# Patient Record
Sex: MALE | Race: WHITE | NOT HISPANIC OR LATINO | Employment: FULL TIME | URBAN - NONMETROPOLITAN AREA
[De-identification: names, ages, dates, MRNs, and addresses within clinical notes are randomized per-mention and may not be internally consistent; named-entity substitution may affect disease eponyms.]

---

## 2023-08-25 ENCOUNTER — HOSPITAL ENCOUNTER (EMERGENCY)
Facility: HOSPITAL | Age: 53
Discharge: HOME | End: 2023-08-25
Attending: NURSE PRACTITIONER | Admitting: NURSE PRACTITIONER

## 2023-08-25 VITALS
BODY MASS INDEX: 35.12 KG/M2 | SYSTOLIC BLOOD PRESSURE: 147 MMHG | DIASTOLIC BLOOD PRESSURE: 107 MMHG | TEMPERATURE: 98.6 F | HEART RATE: 72 BPM | WEIGHT: 265 LBS | HEIGHT: 73 IN | RESPIRATION RATE: 22 BRPM | OXYGEN SATURATION: 99 %

## 2023-08-25 DIAGNOSIS — R07.89 RIGHT-SIDED CHEST WALL PAIN: ICD-10-CM

## 2023-08-25 PROCEDURE — 99213 OFFICE O/P EST LOW 20 MIN: CPT | Performed by: NURSE PRACTITIONER

## 2023-08-25 PROCEDURE — G0463 HOSPITAL OUTPT CLINIC VISIT: HCPCS

## 2023-08-25 RX ORDER — DILTIAZEM HYDROCHLORIDE 240 MG/1
240 CAPSULE, EXTENDED RELEASE ORAL DAILY
COMMUNITY

## 2023-08-25 RX ORDER — PANTOPRAZOLE SODIUM 40 MG/1
40 TABLET, DELAYED RELEASE ORAL DAILY
COMMUNITY

## 2023-08-25 RX ORDER — ATORVASTATIN CALCIUM 40 MG/1
40 TABLET, FILM COATED ORAL DAILY
COMMUNITY

## 2023-08-25 RX ORDER — SERTRALINE HYDROCHLORIDE 100 MG/1
100 TABLET, FILM COATED ORAL DAILY
COMMUNITY

## 2023-08-25 RX ORDER — BUPROPION HYDROCHLORIDE 200 MG/1
200 TABLET, EXTENDED RELEASE ORAL DAILY
COMMUNITY

## 2023-08-25 RX ORDER — PRAZOSIN HYDROCHLORIDE 2 MG/1
2 CAPSULE ORAL AT BEDTIME
COMMUNITY

## 2023-08-25 ASSESSMENT — ENCOUNTER SYMPTOMS
VOMITING: 0
FEVER: 0
HEADACHES: 0
APPETITE CHANGE: 0
COUGH: 0
CHILLS: 0
SHORTNESS OF BREATH: 0
NUMBNESS: 0
NAUSEA: 0
COLOR CHANGE: 0
WEAKNESS: 0
MYALGIAS: 1
FATIGUE: 0
WOUND: 1

## 2023-08-25 NOTE — ED PROVIDER NOTES
History     Chief Complaint   Patient presents with    Wound Check     HPI  Tomas Mckeon is a 53 year old male who presents today with a chief complaint of right chest wall tenderness.  He reports approximately a week ago he was working at [employer] and sustained a superficial puncture wound to the right chest With a thin knife.  The knife was quickly removed after the initial puncture.  He did not seek treatment initially.  He states over the last day or so he has noted tenderness to the right lateral chest muscle.  Tenderness is worse with moving his right upper extremity.  He states he has ambidextrous, however right hand dominant.  He reports increased physical activity at work recently with lifting and moving heavy equipment.  No obvious injury other than reported above.  He has a superficial healing puncture wound to the medial portion of the right upper chest.  No warmth, localized tenderness, drainage, induration to the area.  No fevers or chills.    He states his daughter encouraged him to come in to ensure no infection.      Allergies:  Allergies   Allergen Reactions    Penicillins Rash       Problem List:    There are no problems to display for this patient.       Past Medical History:    History reviewed. No pertinent past medical history.    Past Surgical History:    History reviewed. No pertinent surgical history.    Family History:    History reviewed. No pertinent family history.    Social History:  Marital Status:  Single [1]        Medications:    atorvastatin (LIPITOR) 40 MG tablet  buPROPion (WELLBUTRIN SR) 200 MG 12 hr tablet  diltiazem ER (DILT-XR) 240 MG 24 hr ER beaded capsule  metFORMIN (GLUCOPHAGE) 1000 MG tablet  pantoprazole (PROTONIX) 40 MG EC tablet  prazosin (MINIPRESS) 2 MG capsule  sertraline (ZOLOFT) 100 MG tablet          Review of Systems   Constitutional:  Negative for appetite change, chills, fatigue and fever.   Respiratory:  Negative for cough and shortness of breath.   "  Gastrointestinal:  Negative for nausea and vomiting.   Musculoskeletal:  Positive for myalgias.   Skin:  Positive for wound. Negative for color change.   Neurological:  Negative for weakness, numbness and headaches.       Physical Exam   BP: (!) 147/107  Pulse: 72  Temp: 98.6  F (37  C)  Resp: 22  Height: 185.4 cm (6' 1\")  Weight: 120.2 kg (265 lb)  SpO2: 99 %      Physical Exam  Vitals and nursing note reviewed.   Constitutional:       Appearance: Normal appearance. He is well-developed. He is not ill-appearing or toxic-appearing.   HENT:      Head: Normocephalic and atraumatic.   Cardiovascular:      Rate and Rhythm: Normal rate and regular rhythm.   Pulmonary:      Effort: Pulmonary effort is normal.      Breath sounds: Normal breath sounds.   Chest:      Chest wall: Tenderness present.          Comments: 1 - area of tenderness to palpation.  No bony step off, no crepitus.  The skin is normothermic, without erythema, edema, ecchymosis, induration.  2 -scabbed over superficial appearing puncture wound.  No localized tenderness to palpation.  No induration.  The skin is normothermic, without erythema, edema, fluctuation.  The entire chest wall tissue is without induration, fluctuance, erythema, warmth.  Musculoskeletal:      Cervical back: Normal range of motion and neck supple.   Skin:     General: Skin is warm and dry.   Neurological:      General: No focal deficit present.      Mental Status: He is alert and oriented to person, place, and time.         ED Course        Assessments & Plan (with Medical Decision Making)     I have reviewed the nursing notes.    I have reviewed the findings, diagnosis, plan and need for follow up with the patient.    Medical Decision Making  The patient's presentation was of straightforward complexity (a clearly self-limited or minor problem).    The patient's evaluation involved:  history and exam without other MDM data elements    The patient's management necessitated only low " risk treatment.    Reassured patient no evidence of infection.  He has localized tenderness to the right lateral chest wall that worsens with palpation and movement of the right upper extremity.  He denies any shortness of breath, chest pain, palpitations.  Symptoms are consistent with increased physical activity recently.    Recommend monitoring for worsening symptoms or new concerns.  Follow-up with the ED with worsening of symptoms, follow-up with primary care provider if he is not noting interval improvement of symptoms with conservative measures.    Recommend Tylenol, heat or cold.  Rest.    Discharge Medication List as of 8/25/2023  5:35 PM          Final diagnoses:   Right-sided chest wall pain       8/25/2023   [facility]       Gema Veliz NP  08/25/23 8011

## 2023-08-25 NOTE — ED TRIAGE NOTES
Pt presents with pain and tenderness around healing wound site on the left anterior chest. Reports he cut/stabbed himself at work with a nice on 8/10. States he did not require a visit at that time. Now concerned about infection. Denies fever, chills, nausea. No otc meds.

## 2023-08-25 NOTE — ED TRIAGE NOTES
Pt in for evaluation of pain around a wound on the left anterior chest. Pt reports he cut/stabbed himself at work with a knife in the area on 8/10. States wound healed and he did not require a visit at that time. Over the last few days area has become more tender to the touch and is concerned for possible infection. Denies fever.

## 2023-11-13 ENCOUNTER — APPOINTMENT (OUTPATIENT)
Dept: GENERAL RADIOLOGY | Facility: HOSPITAL | Age: 53
End: 2023-11-13
Attending: NURSE PRACTITIONER
Payer: COMMERCIAL

## 2023-11-13 ENCOUNTER — HOSPITAL ENCOUNTER (EMERGENCY)
Facility: HOSPITAL | Age: 53
Discharge: HOME | End: 2023-11-13
Attending: NURSE PRACTITIONER | Admitting: NURSE PRACTITIONER
Payer: COMMERCIAL

## 2023-11-13 VITALS
BODY MASS INDEX: 33.72 KG/M2 | DIASTOLIC BLOOD PRESSURE: 88 MMHG | WEIGHT: 254.4 LBS | OXYGEN SATURATION: 98 % | TEMPERATURE: 98.4 F | SYSTOLIC BLOOD PRESSURE: 130 MMHG | HEART RATE: 76 BPM | RESPIRATION RATE: 18 BRPM | HEIGHT: 73 IN

## 2023-11-13 DIAGNOSIS — J20.9 ACUTE BRONCHITIS WITH SYMPTOMS > 10 DAYS: ICD-10-CM

## 2023-11-13 PROCEDURE — 99213 OFFICE O/P EST LOW 20 MIN: CPT | Performed by: NURSE PRACTITIONER

## 2023-11-13 PROCEDURE — G0463 HOSPITAL OUTPT CLINIC VISIT: HCPCS

## 2023-11-13 PROCEDURE — 71046 X-RAY EXAM CHEST 2 VIEWS: CPT

## 2023-11-13 RX ORDER — AZITHROMYCIN 250 MG/1
TABLET, FILM COATED ORAL
Qty: 6 TABLET | Refills: 0 | Status: SHIPPED | OUTPATIENT
Start: 2023-11-13 | End: 2023-11-18

## 2023-11-13 ASSESSMENT — ENCOUNTER SYMPTOMS
DIARRHEA: 0
SHORTNESS OF BREATH: 1
VOMITING: 0
SINUS PAIN: 0
SINUS PRESSURE: 0
FEVER: 0
COUGH: 1
FATIGUE: 1
MYALGIAS: 0
EYES NEGATIVE: 1
SORE THROAT: 0
RHINORRHEA: 1
ACTIVITY CHANGE: 1
CHILLS: 0
HEADACHES: 0
NAUSEA: 0

## 2023-11-13 ASSESSMENT — ACTIVITIES OF DAILY LIVING (ADL): ADLS_ACUITY_SCORE: 35

## 2023-11-13 NOTE — Clinical Note
Tomas Mckeon was seen and treated in our emergency department on 11/13/2023.  He may return to work on 11/14/2023.  Evaluated in Urgent Care     If you have any questions or concerns, please don't hesitate to call.      Shayla Gutierrez, CNP

## 2023-11-13 NOTE — ED PROVIDER NOTES
History     Chief Complaint   Patient presents with    Covid Concern     HPI  Tomas Mckeon is a 53 year old male who was diagnosed with COVID 10+ days ago.  He continues to complain of fatigue, runny nose, shortness of breath, and a cough.  Has been taking multiple cold medications including DayQuil, NyQuil, and Delsym cough medication.  Had DayQuil this morning without resolution or relief of his symptoms.  Exposed to multiple people who have COVID at his place of employment.  Quit smoking 1992.  Had 1 COVID vaccination beginning of 2022.  Otherwise, no recent vaccines.  Denies fevers, chills, nausea, vomiting, diarrhea, and headaches.    Allergies:  Allergies   Allergen Reactions    Penicillins Rash       Problem List:    There are no problems to display for this patient.       Past Medical History:    History reviewed. No pertinent past medical history.    Past Surgical History:    History reviewed. No pertinent surgical history.    Family History:    History reviewed. No pertinent family history.    Social History:  Marital Status:  Single [1]        Medications:    atorvastatin (LIPITOR) 40 MG tablet  azithromycin (ZITHROMAX) 250 MG tablet  buPROPion (WELLBUTRIN SR) 200 MG 12 hr tablet  diltiazem ER (DILT-XR) 240 MG 24 hr ER beaded capsule  metFORMIN (GLUCOPHAGE) 1000 MG tablet  pantoprazole (PROTONIX) 40 MG EC tablet  prazosin (MINIPRESS) 2 MG capsule  sertraline (ZOLOFT) 100 MG tablet          Review of Systems   Constitutional:  Positive for activity change and fatigue. Negative for chills and fever.   HENT:  Positive for rhinorrhea. Negative for ear pain, sinus pressure, sinus pain and sore throat.    Eyes: Negative.    Respiratory:  Positive for cough and shortness of breath.    Gastrointestinal:  Negative for diarrhea, nausea and vomiting.   Genitourinary: Negative.    Musculoskeletal:  Negative for myalgias.   Skin: Negative.    Neurological:  Negative for headaches.       Physical Exam   BP:  "130/88  Pulse: 76  Temp: 98.4  F (36.9  C)  Resp: 18  Height: 185.4 cm (6' 1\")  Weight: 115.4 kg (254 lb 6.4 oz)  SpO2: 98 %      Physical Exam  Vitals and nursing note reviewed.   Constitutional:       General: He is not in acute distress.  HENT:      Head: Normocephalic.      Right Ear: Tympanic membrane and ear canal normal.      Left Ear: Tympanic membrane and ear canal normal.      Nose: Nose normal.      Right Sinus: No maxillary sinus tenderness or frontal sinus tenderness.      Left Sinus: No maxillary sinus tenderness or frontal sinus tenderness.      Mouth/Throat:      Lips: Pink.      Mouth: Mucous membranes are moist.      Pharynx: Uvula midline. No posterior oropharyngeal erythema.   Eyes:      Conjunctiva/sclera: Conjunctivae normal.   Cardiovascular:      Rate and Rhythm: Normal rate and regular rhythm.      Heart sounds: Normal heart sounds. No murmur heard.  Pulmonary:      Effort: Pulmonary effort is normal. No respiratory distress.      Breath sounds: Normal breath sounds and air entry. No wheezing.      Comments: Slightly coarse right sided lung sounds  Lymphadenopathy:      Cervical: No cervical adenopathy.   Skin:     General: Skin is warm and dry.   Neurological:      Mental Status: He is alert and oriented to person, place, and time.   Psychiatric:         Behavior: Behavior normal.         ED Course                 Procedures             Results for orders placed or performed during the hospital encounter of 11/13/23 (from the past 24 hour(s))   Chest XR,  PA & LAT    Narrative    PROCEDURE: XR CHEST 2 VIEWS 11/13/2023 12:58 PM    HISTORY: hx pneumonia. EX smoker, covid positive ten days ago. All of  breath and cough    COMPARISONS: None.    TECHNIQUE: 2 views.    FINDINGS: Heart and pulmonary vasculature are normal. Lungs are clear  and no pleural effusion is seen.    There is a mild right convex scoliosis with degenerative change. There  is some bony ankylosis of the mid and lower thoracic " spine anteriorly.         Impression    IMPRESSION: No acute infiltrate.    JOHANNY CARBONE MD         SYSTEM ID:  [id number]       Medications - No data to display    Assessments & Plan (with Medical Decision Making)     I have reviewed the nursing notes.    I have reviewed the findings, diagnosis, plan and need for follow up with the patient.    (J20.9) Acute bronchitis with symptoms > 10 days  Comment: 53 year old male who was diagnosed with COVID 10+ days ago.  He continues to complain of fatigue, runny nose, shortness of breath, and a cough.  Has been taking multiple cold medications including DayQuil, NyQuil, and Delsym cough medication.  Had DayQuil this morning without resolution or relief of his symptoms.  Exposed to multiple people who have COVID at his place of employment.  Quit smoking 1992.  Had 1 COVID vaccination beginning of 2022.  Otherwise, no recent vaccines.  Denies fevers, chills, nausea, vomiting, diarrhea, and headaches.    MDM:NHT. Lungs CTA    Chest x-ray reviewed and per radiology; no acute infiltrate.    Plan: Azithromycin Z-Cruzito.  Education provided and/or discussed for this/these medication and bronchitis.  Dextromethorphan/guaifenesin for cough.  -Increase fluids.   -Complete all antibiotics even if feeling better.    -Taking antibiotics with food may decrease the symptoms, of an upset stomach, that can occur when taking antibiotics. Antibiotics frequently cause diarrhea. Probiotics or yogurt may help prevent or decrease these symptoms.   -Return to be reevaluated if symptoms do not improve, or worsen.    These discharge instructions and medications were reviewed with him and understanding verbalized.    This document was prepared using a combination of typing and voice generated software.  While every attempt was made for accuracy, spelling and grammatical errors may exist.    Discharge Medication List as of 11/13/2023  1:17 PM        START taking these medications    Details    azithromycin (ZITHROMAX) 250 MG tablet Take 2 tablets (500 mg) by mouth daily for 1 day, THEN 1 tablet (250 mg) daily for 4 days., Disp-6 tablet, R-0, E-Prescribe             Final diagnoses:   Acute bronchitis with symptoms > 10 days       11/13/2023   [facility]         Shayla Gutierrez, CNP  11/13/23 4816

## 2023-11-13 NOTE — DISCHARGE INSTRUCTIONS
Dextromethorphan/guaifenesin for cough.  -Increase fluids.   -Complete all antibiotics even if feeling better.    -Taking antibiotics with food may decrease the symptoms, of an upset stomach, that can occur when taking antibiotics. Antibiotics frequently cause diarrhea. Probiotics or yogurt may help prevent or decrease these symptoms.   -Return to be reevaluated if symptoms do not improve, or worsen.

## 2023-11-13 NOTE — ED TRIAGE NOTES
Patient presents to urgent care for COVID concerns. Patient was positive for COVID a week ago last Friday but patient it is having coughing issues that is causing his chest to hurt.   Patient states that he gets pneumonia easily and would like to be checked for it and or bronchitis.

## 2024-02-12 ENCOUNTER — OFFICE VISIT (OUTPATIENT)
Dept: CHIROPRACTIC MEDICINE | Facility: OTHER | Age: 54
End: 2024-02-12
Attending: CHIROPRACTOR
Payer: COMMERCIAL

## 2024-02-12 DIAGNOSIS — M99.02 SEGMENTAL AND SOMATIC DYSFUNCTION OF THORACIC REGION: ICD-10-CM

## 2024-02-12 DIAGNOSIS — M99.03 SEGMENTAL AND SOMATIC DYSFUNCTION OF LUMBAR REGION: Primary | ICD-10-CM

## 2024-02-12 DIAGNOSIS — M54.50 ACUTE RIGHT-SIDED LOW BACK PAIN WITHOUT SCIATICA: ICD-10-CM

## 2024-02-12 PROCEDURE — 99202 OFFICE O/P NEW SF 15 MIN: CPT | Mod: 25 | Performed by: CHIROPRACTOR

## 2024-02-12 PROCEDURE — 98940 CHIROPRACT MANJ 1-2 REGIONS: CPT | Mod: AT | Performed by: CHIROPRACTOR

## 2024-02-14 NOTE — PROGRESS NOTES
Subjective Finding:    Chief compalint: Patient presents with:  Back Pain: Right hip pain   , Pain Scale: 8/10, Intensity: sharp, Duration: 1 months, Radiating: right buttock.    Date of injury:     Activities that the pain restricts:   Home/household/hobbies/social activities: Yes.  Work duties: No.  Sleep: No.  Makes symptoms better: rest.  Makes symptoms worse: walking.  Have you seen anyone else for the symptoms? Md.  Work related: no.  Automobile related injury: No.    Objective and Assessment:    Posture Analysis:   High shoulder: .  Head tilt: .  High iliac crest: right.  Head carriage: neutral.  Thoracic Kyphosis: neutral.  Lumbar Lordosis: forward.    Lumbar Range of Motion: extension decreased and right lateral flexion decreased.  Cervical Range of Motion: .  Thoracic Range of Motion: .  Extremity Range of Motion: .    Palpation:   Piriformis: right, referred pain: no    Segmental dysfunction pre-treatment and treatment area: T7, L5, and Sacrum.    Assessment post-treatment:  Cervical: .  Thoracic: ROM increased.  Lumbar: ROM increased.    Comments: .      Complicating Factors: .    Procedure(s):  CMT:  80653 Chiropractic manipulative treatment 1-2 regions performed   Thoracic: Diversified, See above for level, Prone and Lumbar: Diversified, See above for level, Side posture    Modalities:  None performed this visit    Therapeutic procedures:  None    Plan:  Treatment plan: PRN.  Instructed patient: stretch as instructed at visit.  Short term goals: reduce pain.  Long term goals: restore normal function.  Prognosis: excellent.

## 2024-02-15 ENCOUNTER — OFFICE VISIT (OUTPATIENT)
Dept: CHIROPRACTIC MEDICINE | Facility: OTHER | Age: 54
End: 2024-02-15
Attending: CHIROPRACTOR
Payer: COMMERCIAL

## 2024-02-15 DIAGNOSIS — M99.02 SEGMENTAL AND SOMATIC DYSFUNCTION OF THORACIC REGION: ICD-10-CM

## 2024-02-15 DIAGNOSIS — M54.50 ACUTE BILATERAL LOW BACK PAIN WITHOUT SCIATICA: ICD-10-CM

## 2024-02-15 DIAGNOSIS — M99.03 SEGMENTAL AND SOMATIC DYSFUNCTION OF LUMBAR REGION: Primary | ICD-10-CM

## 2024-02-15 PROCEDURE — 98940 CHIROPRACT MANJ 1-2 REGIONS: CPT | Mod: AT | Performed by: CHIROPRACTOR

## 2024-02-19 ENCOUNTER — OFFICE VISIT (OUTPATIENT)
Dept: CHIROPRACTIC MEDICINE | Facility: OTHER | Age: 54
End: 2024-02-19
Attending: CHIROPRACTOR
Payer: COMMERCIAL

## 2024-02-19 DIAGNOSIS — M99.02 SEGMENTAL AND SOMATIC DYSFUNCTION OF THORACIC REGION: ICD-10-CM

## 2024-02-19 DIAGNOSIS — M54.50 ACUTE RIGHT-SIDED LOW BACK PAIN WITHOUT SCIATICA: ICD-10-CM

## 2024-02-19 DIAGNOSIS — M99.03 SEGMENTAL AND SOMATIC DYSFUNCTION OF LUMBAR REGION: Primary | ICD-10-CM

## 2024-02-19 PROCEDURE — 98940 CHIROPRACT MANJ 1-2 REGIONS: CPT | Mod: AT | Performed by: CHIROPRACTOR

## 2024-02-19 NOTE — PROGRESS NOTES
Subjective Finding:    Chief compalint: Patient presents with:  Back Pain: Some relief  , Pain Scale: 5/10, Intensity: sharp, Duration: 1 months, Radiating: right buttock.    Date of injury:     Activities that the pain restricts:   Home/household/hobbies/social activities: Yes.  Work duties: No.  Sleep: No.  Makes symptoms better: rest.  Makes symptoms worse: walking.  Have you seen anyone else for the symptoms? Md.  Work related: no.  Automobile related injury: No.    Objective and Assessment:    Posture Analysis:   High shoulder: .  Head tilt: .  High iliac crest: right.  Head carriage: neutral.  Thoracic Kyphosis: neutral.  Lumbar Lordosis: forward.    Lumbar Range of Motion: extension decreased and right lateral flexion decreased.  Cervical Range of Motion: .  Thoracic Range of Motion: .  Extremity Range of Motion: .    Palpation:   Piriformis: right, referred pain: no    Segmental dysfunction pre-treatment and treatment area: T78  L45.    Assessment post-treatment:  Cervical: .  Thoracic: ROM increased.  Lumbar: ROM increased.    Comments: .      Complicating Factors: .    Procedure(s):  CMT:  09436 Chiropractic manipulative treatment 1-2 regions performed   Thoracic: Diversified, See above for level, Prone and Lumbar: Diversified, See above for level, Side posture    Modalities:  None performed this visit    Therapeutic procedures:  None    Plan:  Treatment plan: PRN.  Instructed patient: stretch as instructed at visit.  Short term goals: reduce pain.  Long term goals: restore normal function.  Prognosis: excellent.

## 2024-05-08 ENCOUNTER — APPOINTMENT (OUTPATIENT)
Dept: GENERAL RADIOLOGY | Facility: HOSPITAL | Age: 54
End: 2024-05-08
Attending: NURSE PRACTITIONER
Payer: COMMERCIAL

## 2024-05-08 ENCOUNTER — HOSPITAL ENCOUNTER (EMERGENCY)
Facility: HOSPITAL | Age: 54
Discharge: HOME | End: 2024-05-08
Attending: NURSE PRACTITIONER | Admitting: NURSE PRACTITIONER
Payer: COMMERCIAL

## 2024-05-08 VITALS
HEART RATE: 107 BPM | WEIGHT: 251 LBS | HEIGHT: 73 IN | DIASTOLIC BLOOD PRESSURE: 87 MMHG | OXYGEN SATURATION: 97 % | BODY MASS INDEX: 33.27 KG/M2 | RESPIRATION RATE: 18 BRPM | SYSTOLIC BLOOD PRESSURE: 135 MMHG | TEMPERATURE: 99.5 F

## 2024-05-08 DIAGNOSIS — J40 BRONCHITIS: ICD-10-CM

## 2024-05-08 DIAGNOSIS — J02.9 SORE THROAT: ICD-10-CM

## 2024-05-08 DIAGNOSIS — R50.9 FEVER: ICD-10-CM

## 2024-05-08 DIAGNOSIS — R05.1 ACUTE COUGH: Primary | ICD-10-CM

## 2024-05-08 LAB
FLUAV RNA SPEC QL NAA+PROBE: NEGATIVE
FLUBV RNA RESP QL NAA+PROBE: NEGATIVE
GROUP A STREP BY PCR: NOT DETECTED
RSV RNA SPEC NAA+PROBE: NEGATIVE
SARS-COV-2 RNA RESP QL NAA+PROBE: NEGATIVE

## 2024-05-08 PROCEDURE — 99213 OFFICE O/P EST LOW 20 MIN: CPT | Performed by: NURSE PRACTITIONER

## 2024-05-08 PROCEDURE — 250N000013 HC RX MED GY IP 250 OP 250 PS 637: Performed by: NURSE PRACTITIONER

## 2024-05-08 PROCEDURE — 87637 SARSCOV2&INF A&B&RSV AMP PRB: CPT | Performed by: NURSE PRACTITIONER

## 2024-05-08 PROCEDURE — G0463 HOSPITAL OUTPT CLINIC VISIT: HCPCS | Mod: 25

## 2024-05-08 PROCEDURE — 87651 STREP A DNA AMP PROBE: CPT | Performed by: NURSE PRACTITIONER

## 2024-05-08 PROCEDURE — 71045 X-RAY EXAM CHEST 1 VIEW: CPT

## 2024-05-08 PROCEDURE — 250N000011 HC RX IP 250 OP 636: Performed by: NURSE PRACTITIONER

## 2024-05-08 RX ORDER — BENZONATATE 200 MG/1
200 CAPSULE ORAL 3 TIMES DAILY PRN
Qty: 30 CAPSULE | Refills: 0 | Status: SHIPPED | OUTPATIENT
Start: 2024-05-08

## 2024-05-08 RX ORDER — BENZONATATE 100 MG/1
200 CAPSULE ORAL ONCE
Status: COMPLETED | OUTPATIENT
Start: 2024-05-08 | End: 2024-05-08

## 2024-05-08 RX ORDER — ONDANSETRON 4 MG/1
4 TABLET, ORALLY DISINTEGRATING ORAL ONCE
Status: COMPLETED | OUTPATIENT
Start: 2024-05-08 | End: 2024-05-08

## 2024-05-08 RX ORDER — AZITHROMYCIN 250 MG/1
TABLET, FILM COATED ORAL
Qty: 6 TABLET | Refills: 0 | Status: SHIPPED | OUTPATIENT
Start: 2024-05-08 | End: 2024-05-13

## 2024-05-08 RX ADMIN — ONDANSETRON 4 MG: 4 TABLET, ORALLY DISINTEGRATING ORAL at 09:20

## 2024-05-08 RX ADMIN — BENZONATATE 200 MG: 100 CAPSULE ORAL at 09:20

## 2024-05-08 ASSESSMENT — ENCOUNTER SYMPTOMS
SORE THROAT: 1
ABDOMINAL PAIN: 0
CHILLS: 1
DIARRHEA: 0
VOMITING: 1
HEADACHES: 1
FEVER: 1
NAUSEA: 1
COUGH: 1
SHORTNESS OF BREATH: 0

## 2024-05-08 ASSESSMENT — ACTIVITIES OF DAILY LIVING (ADL): ADLS_ACUITY_SCORE: 35

## 2024-05-08 NOTE — DISCHARGE INSTRUCTIONS
You tested negative for COVID-19, influenza, RSV and strep throat.  Your chest x-ray does not show pneumonia today.    Take the antibiotic and cough medication as prescribed.    Tylenol or ibuprofen as needed for pain or fever.    Follow-up with your doctor if no improvement in symptoms.    Return to urgent care or emergency room for any worsening or concerning symptoms.

## 2024-05-08 NOTE — Clinical Note
Tomas Mckeon was seen and treated in our emergency department on 5/8/2024.  He may return to work on 05/09/2024.  Please excuse him from work on 5/7/2024 and 5/8/2024 due to medical illness.     If you have any questions or concerns, please don't hesitate to call.      Mpofu, Prudence, CNP

## 2024-05-08 NOTE — ED TRIAGE NOTES
Patent presents to urgent care for cough, fever, sore throat, vomit, headache that started a week ago. Patient  has taken Dayquil, Nyquil, Deslyn, Ibuprofen, cough drops.   Patient will need a work note

## 2024-05-08 NOTE — ED TRIAGE NOTES
YOSELYN aWllis CNP assessed patient in triage and determined patient Urgent Care appropriate. Will be seen in Urgent Care.

## 2024-05-08 NOTE — ED PROVIDER NOTES
History     Chief Complaint   Patient presents with    Cough    Chills    Nausea, Vomiting, & Diarrhea     HPI  Tomas Mckeon is a 53 year old male who presents to urgent care for evaluation of URI symptoms that started 1 week ago.  He reports cough, sore throat, postnasal drainage, rhinorrhea, nasal congestion, headache, chills and intermittent fevers of unknown temperature.  This morning he tells me that he vomited x 6.  Denies any abdominal pain.  He has had soft stools.  Cough is sometimes productive.  No current tobacco use.  No known history of asthma or COPD.  He has been taking DayQuil, NyQuil, Delsym, ibuprofen and cough drops.      Allergies:  Allergies   Allergen Reactions    Penicillins Rash       Problem List:    There are no problems to display for this patient.       Past Medical History:    History reviewed. No pertinent past medical history.    Past Surgical History:    History reviewed. No pertinent surgical history.    Family History:    History reviewed. No pertinent family history.    Social History:  Marital Status:  Single [1]        Medications:    atorvastatin (LIPITOR) 40 MG tablet  azithromycin (ZITHROMAX) 250 MG tablet  benzonatate (TESSALON) 200 MG capsule  diltiazem ER (DILT-XR) 240 MG 24 hr ER beaded capsule  metFORMIN (GLUCOPHAGE) 1000 MG tablet  pantoprazole (PROTONIX) 40 MG EC tablet  prazosin (MINIPRESS) 2 MG capsule  sertraline (ZOLOFT) 100 MG tablet  buPROPion (WELLBUTRIN SR) 200 MG 12 hr tablet          Review of Systems   Constitutional:  Positive for chills and fever.   HENT:  Positive for congestion and sore throat.    Respiratory:  Positive for cough. Negative for shortness of breath.    Cardiovascular:  Negative for chest pain.   Gastrointestinal:  Positive for nausea and vomiting. Negative for abdominal pain and diarrhea.   Neurological:  Positive for headaches.   All other systems reviewed and are negative.      Physical Exam   BP: 135/87  Pulse: 107  Temp: 99.5  F  "(37.5  C)  Resp: 18  Height: 185.4 cm (6' 1\")  Weight: 113.9 kg (251 lb)  SpO2: 97 %      Physical Exam  Vitals and nursing note reviewed.   Constitutional:       General: He is not in acute distress.     Appearance: He is well-developed. He is not diaphoretic.   HENT:      Head: Normocephalic and atraumatic.      Right Ear: Tympanic membrane and ear canal normal.      Left Ear: Tympanic membrane and ear canal normal.      Nose: Nose normal.      Mouth/Throat:      Mouth: Mucous membranes are moist.   Eyes:      Pupils: Pupils are equal, round, and reactive to light.   Cardiovascular:      Rate and Rhythm: Normal rate.      Heart sounds: Normal heart sounds.   Pulmonary:      Effort: Pulmonary effort is normal. No respiratory distress.      Breath sounds: No stridor. No wheezing, rhonchi or rales.   Musculoskeletal:      Cervical back: Normal range of motion and neck supple.   Skin:     General: Skin is warm and dry.      Coloration: Skin is not pale.   Neurological:      Mental Status: He is alert and oriented to person, place, and time.         ED Course        Procedures              Results for orders placed or performed during the hospital encounter of 05/08/24 (from the past 24 hour(s))   Symptomatic Influenza A/B, RSV, & SARS-CoV2 PCR (COVID-19) Nasopharyngeal    Specimen: Nasopharyngeal; Swab   Result Value Ref Range    Influenza A PCR Negative Negative    Influenza B PCR Negative Negative    RSV PCR Negative Negative    SARS CoV2 PCR Negative Negative    Narrative    Testing was performed using the Xpert Xpress CoV2/Flu/RSV Assay on the [employer] Instrument. This test should be ordered for the detection of SARS-CoV-2, influenza, and RSV viruses in individuals who meet clinical and/or epidemiological criteria. Test performance is unknown in asymptomatic patients. This test is for in vitro diagnostic use under the FDA EUA for laboratories certified under CLIA to perform high or moderate complexity " testing. This test has not been FDA cleared or approved. A negative result does not rule out the presence of PCR inhibitors in the specimen or target RNA in concentration below the limit of detection for the assay. If only one viral target is positive but coinfection with multiple targets is suspected, the sample should be re-tested with another FDA cleared, approved, or authorized test, if coinfection would change clinical management. This test was validated by the [facility] [employer]. These laboratories are certified under the Clinical Laboratory Improvement Amendments of 1988 (CLIA-88) as qualified to perform high complexity laboratory testing.   Group A Streptococcus PCR Throat Swab    Specimen: Throat; Swab   Result Value Ref Range    Group A strep by PCR Not Detected Not Detected    Narrative    The Xpert Xpress Strep A test, performed on the [employer], is a rapid, qualitative in vitro diagnostic test for the detection of Streptococcus pyogenes (Group A ß-hemolytic Streptococcus, Strep A) in throat swab specimens from patients with signs and symptoms of pharyngitis. The Xpert Xpress Strep A test can be used as an aid in the diagnosis of Group A Streptococcal pharyngitis. The assay is not intended to monitor treatment for Group A Streptococcus infections. The Xpert Xpress Strep A test utilizes an automated real-time polymerase chain reaction (PCR) to detect Streptococcus pyogenes DNA.   XR Chest Port 1 View    Narrative    PROCEDURE: XR CHEST PORT 1 VIEW 5/8/2024 9:19 AM    HISTORY: cough x 1 week    COMPARISONS: 11/13/2023.    TECHNIQUE: Single portable view.    FINDINGS: Heart is not enlarged. Lungs are relatively clear and no  pleural effusion is seen.         Impression    IMPRESSION: No acute disease.    JOHANNY CARBONE MD         SYSTEM ID:  [id number]       Medications   ondansetron (ZOFRAN ODT) ODT tab 4 mg (4 mg Oral $Given 5/8/24 0932)   benzonatate (TESSALON) capsule  200 mg (200 mg Oral $Given 5/8/24 1125)       Assessments & Plan (with Medical Decision Making)   53-year-old male that presented for evaluation of URI symptoms x 1 week progressively worsening.  Today he did have vomiting episodes which prompted this visit.  He has a soft and nontender abdomen on palpation.  Heart rate and rhythm are regular.  Occasional cough heard during this visit.  His oxygen saturation is 97% on room air.  He tested negative for strep throat, COVID-19, influenza and RSV.  His chest x-ray was negative for acute findings today.    I discussed all findings with patient.  Given the length and progression of his symptoms we will treat for possible bacterial bronchitis as well as to help with any inflammation with azithromycin.  Also prescribed benzonatate for the cough.  Advised him to continue with Tylenol or ibuprofen as needed for pain or fevers.  Drink plenty of fluids so you are staying hydrated.  Do warm salt water gargles.  Follow-up with primary doctor if no improvement in symptoms.  Return to urgent care or emergency room for any worsening or concerning symptoms.    I have reviewed the nursing notes.    I have reviewed the findings, diagnosis, plan and need for follow up with the patient.  This document was prepared using a combination of typing and voice generated software.  While every attempt was made for accuracy, spelling and grammatical errors may exist.         New Prescriptions    AZITHROMYCIN (ZITHROMAX) 250 MG TABLET    Take 2 tablets (500 mg) by mouth daily for 1 day, THEN 1 tablet (250 mg) daily for 4 days.    BENZONATATE (TESSALON) 200 MG CAPSULE    Take 1 capsule (200 mg) by mouth 3 times daily as needed for cough       Final diagnoses:   Acute cough   Sore throat   Fever   Bronchitis       5/8/2024   [facility]       Mpofu, Prudence, CNP  05/08/24 1028

## 2024-05-15 ENCOUNTER — OFFICE VISIT (OUTPATIENT)
Dept: CHIROPRACTIC MEDICINE | Facility: OTHER | Age: 54
End: 2024-05-15
Payer: COMMERCIAL

## 2024-05-15 DIAGNOSIS — M99.03 SEGMENTAL AND SOMATIC DYSFUNCTION OF LUMBAR REGION: Primary | ICD-10-CM

## 2024-05-15 DIAGNOSIS — M99.02 SEGMENTAL AND SOMATIC DYSFUNCTION OF THORACIC REGION: ICD-10-CM

## 2024-05-15 DIAGNOSIS — M54.50 ACUTE RIGHT-SIDED LOW BACK PAIN WITHOUT SCIATICA: ICD-10-CM

## 2024-05-15 PROCEDURE — 98940 CHIROPRACT MANJ 1-2 REGIONS: CPT | Mod: AT | Performed by: CHIROPRACTOR

## 2024-05-16 NOTE — PROGRESS NOTES
Subjective Finding:    Chief compalint: Patient presents with:  Back Pain: Right lower back pian   , Pain Scale: 5/10, Intensity: sharp, Duration: 1 weeks, Radiating: no.    Date of injury:     Activities that the pain restricts:   Home/household/hobbies/social activities: Yes.  Work duties: No.  Sleep: No.  Makes symptoms better: rest.  Makes symptoms worse: lumbar extension and lumbar flexion.  Have you seen anyone else for the symptoms? No.  Work related:   Automobile relatnjury: No.    Objective and Assessment:    Posture Analysis:   High shoulder: .  Head tilt: .  High iliac crest: right.  Head carriage: neutral.  Thoracic Kyphosis: neutral.  Lumbar Lordosis: forward.    Lumbar Range of Motion: extension decreased and right lateral flexion decreased.  Cervical Range of Motion: .  Thoracic Range of Motion: .  Extremity Range of Motion: .    Palpation:   Quad lumb: right, referred pain: no    Segmental dysfunction pre-treatment and treatment area: T8, T11, and L4.    Assessment post-treatment:  Cervical: ROM increased.  Thoracic: ROM increased.  Lumbar: ROM increased.    Comments: .      Complicating Factors: .    Procedure(s):  [facility]:  12525 Chiropractic manipulative treatment 1-2 regions performed   Thoracic: Diversified, See above for level, Prone and Lumbar: Diversified, See above for level, Side posture    Modalities:  None performed this visit    Therapeutic procedures:  None    Plan:  Treatment plan: PRN.  Instructed patient: stretch as instructed at visit.  Short term goals: reduce pain.  Long term goals: restore normal function.  Prognosis: very good.

## 2024-07-18 ENCOUNTER — OFFICE VISIT (OUTPATIENT)
Dept: CHIROPRACTIC MEDICINE | Facility: OTHER | Age: 54
End: 2024-07-18
Attending: CHIROPRACTOR
Payer: COMMERCIAL

## 2024-07-18 DIAGNOSIS — M99.03 SEGMENTAL AND SOMATIC DYSFUNCTION OF LUMBAR REGION: Primary | ICD-10-CM

## 2024-07-18 DIAGNOSIS — M54.42 ACUTE BILATERAL LOW BACK PAIN WITH BILATERAL SCIATICA: ICD-10-CM

## 2024-07-18 DIAGNOSIS — M54.41 ACUTE BILATERAL LOW BACK PAIN WITH BILATERAL SCIATICA: ICD-10-CM

## 2024-07-18 DIAGNOSIS — M99.02 SEGMENTAL AND SOMATIC DYSFUNCTION OF THORACIC REGION: ICD-10-CM

## 2024-07-18 DIAGNOSIS — M99.01 SEGMENTAL AND SOMATIC DYSFUNCTION OF CERVICAL REGION: ICD-10-CM

## 2024-07-18 PROCEDURE — 98941 CHIROPRACT MANJ 3-4 REGIONS: CPT | Mod: AT | Performed by: CHIROPRACTOR

## 2024-07-24 ENCOUNTER — OFFICE VISIT (OUTPATIENT)
Dept: CHIROPRACTIC MEDICINE | Facility: OTHER | Age: 54
End: 2024-07-24
Payer: COMMERCIAL

## 2024-07-24 DIAGNOSIS — M99.02 SEGMENTAL AND SOMATIC DYSFUNCTION OF THORACIC REGION: ICD-10-CM

## 2024-07-24 DIAGNOSIS — M99.03 SEGMENTAL AND SOMATIC DYSFUNCTION OF LUMBAR REGION: Primary | ICD-10-CM

## 2024-07-24 DIAGNOSIS — M54.50 ACUTE BILATERAL LOW BACK PAIN WITHOUT SCIATICA: ICD-10-CM

## 2024-07-24 PROCEDURE — 98940 CHIROPRACT MANJ 1-2 REGIONS: CPT | Mod: AT | Performed by: CHIROPRACTOR

## 2024-07-24 NOTE — PROGRESS NOTES
Subjective Finding:    Chief compalint: Patient presents with:  Back Pain , Pain Scale: 4/10, Intensity: sharp, Duration: 2 weeks, Radiating: bilateral buttock.    Date of injury:     Activities that the pain restricts:   Home/household/hobbies/social activities: Yes.  Work duties: Yes.  Sleep: No.  Makes symptoms better: rest.  Makes symptoms worse: walking.  Have you seen anyone else for the symptoms? No.  Work related: No.  Automobile related injury: No.    Objective and Assessment:    Posture Analysis:   High shoulder: .  Head tilt: .  High iliac crest: .  Head carriage: neutral.  Thoracic Kyphosis: neutral.  Lumbar Lordosis: forward.    Lumbar Range of Motion: flexion decreased.  Cervical Range of Motion: .  Thoracic Range of Motion: .  Extremity Range of Motion: .    Palpation:   Quad lumb: bilateral, referred pain: yes    Segmental dysfunction pre-treatment and treatment area: C7, T3, and L5.    Assessment post-treatment:  Cervical: ROM increased.  Thoracic: ROM increased.  Lumbar: ROM increased.    Comments: .      Complicating Factors: .    Procedure(s):  [facility]:  34585 Chiropractic manipulative treatment 3-4 regions performed   Cervical: Diversified, See above for level, Supine, Thoracic: Diversified, See above for level, Prone, and Lumbar: Diversified, See above for level, Side posture    Modalities:  None performed this visit    Therapeutic procedures:  None    Plan:  Treatment plan: PRN.  Instructed patient: stretch as instructed at visit.  Short term goals: increase ROM.  Long term goals: increase ADL.  Prognosis: very good.

## 2024-07-31 NOTE — PROGRESS NOTES
Subjective Finding:    Chief compalint: Patient presents with:  Back Pain , Pain Scale: 6/10, Intensity: sharp, Duration: 2 weeks, Radiating: bilateral buttock.    Date of injury:     Activities that the pain restricts:   Home/household/hobbies/social activities: Yes.  Work duties: Yes.  Sleep: No.  Makes symptoms better: rest.  Makes symptoms worse: lumbar flexion.  Have you seen anyone else for the symptoms? No.  Work related: No.  Automobile related injury: No.    Objective and Assessment:    Posture Analysis:   High shoulder: .  Head tilt: .  High iliac crest: .  Head carriage: neutral.  Thoracic Kyphosis: neutral.  Lumbar Lordosis: forward.    Lumbar Range of Motion: extension decreased.  Cervical Range of Motion: .  Thoracic Range of Motion: .  Extremity Range of Motion: .    Palpation:   Quad lumb: bilateral, referred pain: no    Segmental dysfunction pre-treatment and treatment area: T7, L4, and L5.    Assessment post-treatment:  Cervical: .  Thoracic: ROM increased.  Lumbar: ROM increased.    Comments: .      Complicating Factors: .    Procedure(s):  [facility]:  T L spine    T67  L45    Thoracic: Diversified, See above for level, Prone and Lumbar: Diversified, See above for level, Side posture    Modalities:  None performed this visit    Therapeutic procedures:  None    Plan:  Treatment plan: PRN.  Instructed patient: walk 10 minutes.  Short term goals: increase ROM.  Long term goals: increase ADL.  Prognosis: very good.

## 2025-01-06 ENCOUNTER — HOSPITAL ENCOUNTER (EMERGENCY)
Facility: HOSPITAL | Age: 55
Discharge: HOME | End: 2025-01-06
Payer: COMMERCIAL

## 2025-01-06 VITALS
BODY MASS INDEX: 33.12 KG/M2 | OXYGEN SATURATION: 97 % | TEMPERATURE: 97.7 F | RESPIRATION RATE: 18 BRPM | DIASTOLIC BLOOD PRESSURE: 102 MMHG | HEIGHT: 73 IN | SYSTOLIC BLOOD PRESSURE: 147 MMHG | HEART RATE: 95 BPM

## 2025-01-06 DIAGNOSIS — B37.2 YEAST INFECTION OF THE SKIN: ICD-10-CM

## 2025-01-06 PROCEDURE — G0463 HOSPITAL OUTPT CLINIC VISIT: HCPCS

## 2025-01-06 PROCEDURE — 99213 OFFICE O/P EST LOW 20 MIN: CPT

## 2025-01-06 RX ORDER — NYSTATIN 100000 [USP'U]/G
POWDER TOPICAL 3 TIMES DAILY
Qty: 60 G | Refills: 0 | Status: SHIPPED | OUTPATIENT
Start: 2025-01-06 | End: 2025-01-13

## 2025-01-06 ASSESSMENT — ENCOUNTER SYMPTOMS
CHILLS: 0
ACTIVITY CHANGE: 0
FEVER: 0
APPETITE CHANGE: 0

## 2025-01-06 NOTE — ED PROVIDER NOTES
"  History     Chief Complaint   Patient presents with    Rash     HPI  Tomas Mckeon is a 54 year old male who presents to the urgent care with a 2 month history of a rash to lower abdomen. He was seen at [facility] last week and given prednisone burst and doxycyline without improvement. He denies fevers, chills, and feeling unwell. Also denies any new products or exposures. Hx of DM 2, does not regularly check BG, states last A1c 6.     Allergies:  Allergies   Allergen Reactions    Penicillins Rash       Problem List:    There are no active problems to display for this patient.       Past Medical History:    No past medical history on file.    Past Surgical History:    No past surgical history on file.    Family History:    No family history on file.    Social History:  Marital Status:  Single [1]        Medications:    atorvastatin (LIPITOR) 40 MG tablet  diltiazem ER (DILT-XR) 240 MG 24 hr ER beaded capsule  metFORMIN (GLUCOPHAGE) 1000 MG tablet  pantoprazole (PROTONIX) 40 MG EC tablet  prazosin (MINIPRESS) 2 MG capsule  sertraline (ZOLOFT) 100 MG tablet  benzonatate (TESSALON) 200 MG capsule  buPROPion (WELLBUTRIN SR) 200 MG 12 hr tablet  nystatin (MYCOSTATIN) 101319 UNIT/GM external powder          Review of Systems   Constitutional:  Negative for activity change, appetite change, chills and fever.   Skin:  Positive for rash.   All other systems reviewed and are negative.      Physical Exam   BP: (!) 147/102  Pulse: 95  Temp: 97.7  F (36.5  C)  Resp: 18  Height: 185.4 cm (6' 1\")  SpO2: 97 %      Physical Exam  Vitals and nursing note reviewed.   Constitutional:       General: He is not in acute distress.     Appearance: Normal appearance. He is not ill-appearing or toxic-appearing.   Cardiovascular:      Rate and Rhythm: Normal rate and regular rhythm.      Heart sounds: Normal heart sounds.   Pulmonary:      Effort: Pulmonary effort is normal.      Breath sounds: Normal breath sounds. No wheezing, rhonchi or " rales.   Skin:     General: Skin is warm and dry.      Capillary Refill: Capillary refill takes less than 2 seconds.      Findings: Erythema present. No bruising.             Comments: Warm, reddened, moist rash to abdominal fold. Most consistent with yeast. No purulence.    Neurological:      General: No focal deficit present.      Mental Status: He is alert and oriented to person, place, and time.   Psychiatric:         Mood and Affect: Mood normal.         Behavior: Behavior normal.         Thought Content: Thought content normal.         Judgment: Judgment normal.         ED Course        Procedures       No results found for this or any previous visit (from the past 24 hours).    Medications - No data to display    Assessments & Plan (with Medical Decision Making)     I have reviewed the nursing notes.    I have reviewed the findings, diagnosis, plan and need for follow up with the patient.  Tomas Mckeon is a 54 year old male who presents to the urgent care with a 2 month history of a rash to lower abdomen. He was seen at [facility] last week and given prednisone burst and doxycyline without improvement. He denies fevers, chills, and feeling unwell. Also denies any new products or exposures. Hx of DM 2, does not regularly check BG, states last A1c 6.     MDM: BG slightly elevated, other vital signs normal. He is well appearing and non toxic. Alert and interactive. Lungs clear, heart tones regular. Erythematous moist rash to lower abdomen in skin fold. No improvement with prednisone and doxycyline. Appearance most consistent with yeast. Will treat with topical nystatin. Encouraged close follow up in clinic. Supportive measures and return precautions discussed. He is in agreement with plan.     (B37.2) Yeast infection of the skin  Plan: Nystatin powder 3 times daily for 7 days.   Keep area dry.   Follow up in the clinic for a recheck.   Return with any new or concerning symptoms. Understanding verbalized.        New Prescriptions    NYSTATIN (MYCOSTATIN) 522464 UNIT/GM EXTERNAL POWDER    Apply topically 3 times daily for 7 days.       Final diagnoses:   Yeast infection of the skin       1/6/2025   [facility]       Hortencia Garcia NP  01/06/25 0923

## 2025-01-06 NOTE — ED TRIAGE NOTES
Pt presents with concerns of itchy rash around waist line and hips bilaterally. Pt reports rash started approx. 2 months ago, was seen at [facility] 2 weeks ago and given prednisone and an antibiotic. Pt reports no improvement of symptoms use of cortisone cream and various lotions

## 2025-01-06 NOTE — DISCHARGE INSTRUCTIONS
Nystatin powder 3 times daily for 7 days.   Keep area dry.   Follow up in the clinic for a recheck.   Return with any new or concerning symptoms.

## 2025-01-12 ENCOUNTER — HOSPITAL ENCOUNTER (EMERGENCY)
Facility: HOSPITAL | Age: 55
Discharge: HOME | End: 2025-01-12
Attending: EMERGENCY MEDICINE
Payer: COMMERCIAL

## 2025-01-12 VITALS
HEART RATE: 99 BPM | OXYGEN SATURATION: 97 % | BODY MASS INDEX: 33.67 KG/M2 | DIASTOLIC BLOOD PRESSURE: 112 MMHG | RESPIRATION RATE: 16 BRPM | TEMPERATURE: 97 F | SYSTOLIC BLOOD PRESSURE: 167 MMHG | WEIGHT: 255.18 LBS

## 2025-01-12 DIAGNOSIS — B37.2 CANDIDIASIS OF SKIN: ICD-10-CM

## 2025-01-12 DIAGNOSIS — L01.00 IMPETIGO: ICD-10-CM

## 2025-01-12 PROCEDURE — 99284 EMERGENCY DEPT VISIT MOD MDM: CPT

## 2025-01-12 PROCEDURE — 99283 EMERGENCY DEPT VISIT LOW MDM: CPT | Performed by: EMERGENCY MEDICINE

## 2025-01-12 RX ORDER — KETOCONAZOLE 20 MG/G
CREAM TOPICAL 2 TIMES DAILY
Qty: 60 G | Refills: 3 | Status: SHIPPED | OUTPATIENT
Start: 2025-01-12 | End: 2025-01-26

## 2025-01-12 RX ORDER — DOXYCYCLINE 100 MG/1
100 CAPSULE ORAL 2 TIMES DAILY
Qty: 28 CAPSULE | Refills: 0 | Status: SHIPPED | OUTPATIENT
Start: 2025-01-12 | End: 2025-01-26

## 2025-01-12 RX ORDER — MUPIROCIN 20 MG/G
OINTMENT TOPICAL 2 TIMES DAILY
Qty: 22 G | Refills: 0 | Status: SHIPPED | OUTPATIENT
Start: 2025-01-12 | End: 2025-01-19

## 2025-01-12 RX ORDER — HYDROXYZINE HYDROCHLORIDE 25 MG/1
50 TABLET, FILM COATED ORAL AT BEDTIME
Qty: 28 TABLET | Refills: 0 | Status: SHIPPED | OUTPATIENT
Start: 2025-01-12 | End: 2025-01-26

## 2025-01-12 ASSESSMENT — COLUMBIA-SUICIDE SEVERITY RATING SCALE - C-SSRS
6. HAVE YOU EVER DONE ANYTHING, STARTED TO DO ANYTHING, OR PREPARED TO DO ANYTHING TO END YOUR LIFE?: NO
1. IN THE PAST MONTH, HAVE YOU WISHED YOU WERE DEAD OR WISHED YOU COULD GO TO SLEEP AND NOT WAKE UP?: NO
2. HAVE YOU ACTUALLY HAD ANY THOUGHTS OF KILLING YOURSELF IN THE PAST MONTH?: NO

## 2025-01-12 ASSESSMENT — ACTIVITIES OF DAILY LIVING (ADL)
ADLS_ACUITY_SCORE: 41
ADLS_ACUITY_SCORE: 41

## 2025-01-12 NOTE — ED TRIAGE NOTES
Pt presents with c/o a rash and itching t/o his body that began on his waistline about 2 months ago. Pt has been seen at [facility] [place] and our UC with no relief.

## 2025-01-12 NOTE — DISCHARGE INSTRUCTIONS
[facility] to reassess your blood pressure and diabetes and consider dermatology referral.    Wash your entire skin every 3 days with Hibiclens until better

## 2025-01-12 NOTE — ED PROVIDER NOTES
History     Chief Complaint   Patient presents with    Rash     HPI  Tomas Mckeon is a 54 year old male who presents to the ED with concern over pruritic rash.  Ongoing for about 2 months initially started in the fold below his pannus and was seen at [facility] he also was seen at our urgent care as well.  Initially prescribed prednisone and doxycycline with no change, he was given nystatin at our urgent care in a powder form but he notes a powder has trouble standing on his skin he thinks it is improving below his abdomen but the remainder of the skin lesions on his arms and trunk are not in some areas getting worse.  He has no pets that are suspicious for fleas he has no lesions that are in webspaces or surrounding wrists axillary or typical scabies areas.  He is unsure as to how his diabetes are the last hemoglobin A1c was 6.1 he has not checked his blood sugars lately and with the holidays has had increased carbohydrates.    Allergies:  Allergies   Allergen Reactions    Penicillins Rash       Problem List:    There are no active problems to display for this patient.       Past Medical History:    No past medical history on file.    Past Surgical History:    No past surgical history on file.    Family History:    No family history on file.    Social History:  Marital Status:  Single [1]        Medications:    atorvastatin (LIPITOR) 40 MG tablet  diltiazem ER (DILT-XR) 240 MG 24 hr ER beaded capsule  doxycycline hyclate (VIBRAMYCIN) 100 MG capsule  hydrOXYzine HCl (ATARAX) 25 MG tablet  ketoconazole (NIZORAL) 2 % external cream  metFORMIN (GLUCOPHAGE) 1000 MG tablet  mupirocin (BACTROBAN) 2 % external ointment  nystatin (MYCOSTATIN) 126619 UNIT/GM external powder  pantoprazole (PROTONIX) 40 MG EC tablet  prazosin (MINIPRESS) 2 MG capsule  sertraline (ZOLOFT) 100 MG tablet          Review of Systems   All other systems reviewed and are negative.      Physical Exam   BP: (!) 167/112  Pulse: 99  Temp: 97   F (36.1  C)  Resp: 16  Weight: 115.8 kg (255 lb 2.9 oz)  SpO2: 97 %      Physical Exam  Vitals and nursing note reviewed.   Constitutional:       General: He is not in acute distress.     Appearance: Normal appearance. He is not ill-appearing, toxic-appearing or diaphoretic.   HENT:      Head: Normocephalic and atraumatic.      Right Ear: External ear normal.      Left Ear: External ear normal.      Nose: Nose normal.      Mouth/Throat:      Mouth: Mucous membranes are moist.      Pharynx: Oropharynx is clear.   Eyes:      General: No scleral icterus.     Extraocular Movements: Extraocular movements intact.      Conjunctiva/sclera: Conjunctivae normal.   Cardiovascular:      Rate and Rhythm: Normal rate and regular rhythm.      Pulses: Normal pulses.   Pulmonary:      Effort: Pulmonary effort is normal. No respiratory distress.   Abdominal:      General: Abdomen is flat.      Palpations: Abdomen is soft.      Tenderness: There is no abdominal tenderness.   Musculoskeletal:         General: No deformity. Normal range of motion.      Cervical back: Normal range of motion and neck supple.   Skin:     General: Skin is warm and dry.      Comments: Patient has what appears to be 2 different rashes 1 is on his arms and trunk isolated honey crusted scabs with some mild surrounding erythema consistent with impetigo and on the anterior skin fold beneath his pannus area confluent erythema with some satellite lesions and desquamation of the skin consistent with Candida   Neurological:      General: No focal deficit present.      Mental Status: He is alert and oriented to person, place, and time. Mental status is at baseline.   Psychiatric:         Mood and Affect: Mood normal.         Behavior: Behavior normal.         Thought Content: Thought content normal.         Judgment: Judgment normal.         ED Course        Procedures              Critical Care time:  none              No results found for this or any previous visit  (from the past 24 hours).    Medications - No data to display    Assessments & Plan (with Medical Decision Making)     I have reviewed the nursing notes.    I have reviewed the findings, diagnosis, plan and need for follow up with the patient.           Medical Decision Making  The patient's presentation was of straightforward complexity (a clearly self-limited or minor problem).    The patient's evaluation involved:  history and exam without other MDM data elements    The patient's management necessitated moderate risk (prescription drug management including medications given in the ED).    Patient's impetigo or staph like skin rash will be treated with doxycycline for extended period as well as Hibiclens washes and Bactroban nasal and topical treatment, Candida will be treated with ketoconazole cream until resolved, [facility] for [facility] of his diabetes discussed importance of sugar control and blood pressure control.    New Prescriptions    DOXYCYCLINE HYCLATE (VIBRAMYCIN) 100 MG CAPSULE    Take 1 capsule (100 mg) by mouth 2 times daily for 14 days.    HYDROXYZINE HCL (ATARAX) 25 MG TABLET    Take 2 tablets (50 mg) by mouth at bedtime for 14 doses.    KETOCONAZOLE (NIZORAL) 2 % EXTERNAL CREAM    Apply topically 2 times daily for 14 days.    MUPIROCIN (BACTROBAN) 2 % EXTERNAL OINTMENT    Apply topically 2 times daily for 7 days. Apply to nostrils twice a day for 1 week and open areas of skin       Final diagnoses:   Candidiasis of skin   Impetigo       1/12/2025   [facility]       Edi Rangel MD  01/12/25 4212

## 2025-08-07 ENCOUNTER — HOSPITAL ENCOUNTER (EMERGENCY)
Facility: HOSPITAL | Age: 55
Discharge: HOME | End: 2025-08-07
Attending: FAMILY MEDICINE
Payer: COMMERCIAL

## 2025-08-07 VITALS
RESPIRATION RATE: 19 BRPM | SYSTOLIC BLOOD PRESSURE: 136 MMHG | DIASTOLIC BLOOD PRESSURE: 88 MMHG | WEIGHT: 260.03 LBS | HEART RATE: 62 BPM | OXYGEN SATURATION: 97 % | BODY MASS INDEX: 34.31 KG/M2 | TEMPERATURE: 97.8 F

## 2025-08-07 DIAGNOSIS — R55 PRE-SYNCOPE: Primary | ICD-10-CM

## 2025-08-07 PROBLEM — E11.9 DM (DIABETES MELLITUS), TYPE 2 (H): Chronic | Status: ACTIVE | Noted: 2025-08-07

## 2025-08-07 PROBLEM — N40.0 BPH (BENIGN PROSTATIC HYPERPLASIA): Chronic | Status: ACTIVE | Noted: 2025-08-07

## 2025-08-07 PROBLEM — F32.A DEPRESSION: Chronic | Status: ACTIVE | Noted: 2025-08-07

## 2025-08-07 PROBLEM — E78.5 HYPERLIPIDEMIA: Chronic | Status: ACTIVE | Noted: 2025-08-07

## 2025-08-07 PROBLEM — K21.9 GERD (GASTROESOPHAGEAL REFLUX DISEASE): Chronic | Status: ACTIVE | Noted: 2025-08-07

## 2025-08-07 LAB
ANION GAP SERPL CALCULATED.3IONS-SCNC: 15 MMOL/L (ref 7–15)
BASOPHILS # BLD AUTO: 0.1 10E3/UL (ref 0–0.2)
BASOPHILS NFR BLD AUTO: 1 %
BUN SERPL-MCNC: 13.8 MG/DL (ref 6–20)
CALCIUM SERPL-MCNC: 9.5 MG/DL (ref 8.8–10.4)
CHLORIDE SERPL-SCNC: 106 MMOL/L (ref 98–107)
CREAT SERPL-MCNC: 1.08 MG/DL (ref 0.67–1.17)
EGFRCR SERPLBLD CKD-EPI 2021: 82 ML/MIN/1.73M2
EOSINOPHIL # BLD AUTO: 0.2 10E3/UL (ref 0–0.7)
EOSINOPHIL NFR BLD AUTO: 2 %
ERYTHROCYTE [DISTWIDTH] IN BLOOD BY AUTOMATED COUNT: 14.3 % (ref 10–15)
GLUCOSE BLDC GLUCOMTR-MCNC: 156 MG/DL (ref 70–99)
GLUCOSE SERPL-MCNC: 135 MG/DL (ref 70–99)
HCO3 SERPL-SCNC: 21 MMOL/L (ref 22–29)
HCT VFR BLD AUTO: 39.5 % (ref 40–53)
HGB BLD-MCNC: 12.9 G/DL (ref 13.3–17.7)
HOLD SPECIMEN: NORMAL
IMM GRANULOCYTES # BLD: 0 10E3/UL
IMM GRANULOCYTES NFR BLD: 0 %
LYMPHOCYTES # BLD AUTO: 2.2 10E3/UL (ref 0.8–5.3)
LYMPHOCYTES NFR BLD AUTO: 30 %
MCH RBC QN AUTO: 27.1 PG (ref 26.5–33)
MCHC RBC AUTO-ENTMCNC: 32.7 G/DL (ref 31.5–36.5)
MCV RBC AUTO: 83 FL (ref 78–100)
MONOCYTES # BLD AUTO: 0.6 10E3/UL (ref 0–1.3)
MONOCYTES NFR BLD AUTO: 8 %
NEUTROPHILS # BLD AUTO: 4.4 10E3/UL (ref 1.6–8.3)
NEUTROPHILS NFR BLD AUTO: 59 %
NRBC # BLD AUTO: 0 10E3/UL
NRBC BLD AUTO-RTO: 0 /100
PLATELET # BLD AUTO: 282 10E3/UL (ref 150–450)
POTASSIUM SERPL-SCNC: 3.8 MMOL/L (ref 3.4–5.3)
RBC # BLD AUTO: 4.76 10E6/UL (ref 4.4–5.9)
SODIUM SERPL-SCNC: 142 MMOL/L (ref 135–145)
WBC # BLD AUTO: 7.6 10E3/UL (ref 4–11)

## 2025-08-07 PROCEDURE — 258N000003 HC RX IP 258 OP 636: Performed by: FAMILY MEDICINE

## 2025-08-07 PROCEDURE — 85004 AUTOMATED DIFF WBC COUNT: CPT | Performed by: FAMILY MEDICINE

## 2025-08-07 PROCEDURE — 96360 HYDRATION IV INFUSION INIT: CPT

## 2025-08-07 PROCEDURE — 80048 BASIC METABOLIC PNL TOTAL CA: CPT | Performed by: FAMILY MEDICINE

## 2025-08-07 PROCEDURE — 36415 COLL VENOUS BLD VENIPUNCTURE: CPT | Performed by: FAMILY MEDICINE

## 2025-08-07 PROCEDURE — 99284 EMERGENCY DEPT VISIT MOD MDM: CPT | Performed by: FAMILY MEDICINE

## 2025-08-07 PROCEDURE — 99283 EMERGENCY DEPT VISIT LOW MDM: CPT | Mod: 25

## 2025-08-07 PROCEDURE — 99283 EMERGENCY DEPT VISIT LOW MDM: CPT | Performed by: FAMILY MEDICINE

## 2025-08-07 PROCEDURE — 82962 GLUCOSE BLOOD TEST: CPT

## 2025-08-07 RX ORDER — DILTIAZEM HYDROCHLORIDE 120 MG/1
120 CAPSULE, EXTENDED RELEASE ORAL DAILY
Qty: 30 CAPSULE | Refills: 0 | Status: SHIPPED | OUTPATIENT
Start: 2025-08-07

## 2025-08-07 RX ORDER — TAMSULOSIN HYDROCHLORIDE 0.4 MG/1
0.8 CAPSULE ORAL DAILY
COMMUNITY

## 2025-08-07 RX ADMIN — SODIUM CHLORIDE 1000 ML: 9 INJECTION, SOLUTION INTRAVENOUS at 09:53

## 2025-08-07 ASSESSMENT — ENCOUNTER SYMPTOMS
ACTIVITY CHANGE: 0
ARTHRALGIAS: 0
SHORTNESS OF BREATH: 0
DIARRHEA: 0
VOMITING: 0
ABDOMINAL PAIN: 0
SORE THROAT: 0
NECK STIFFNESS: 0
RHINORRHEA: 0
LIGHT-HEADEDNESS: 1
EYE REDNESS: 0
APPETITE CHANGE: 0
COUGH: 0
FEVER: 0
HEADACHES: 0
DYSURIA: 0
DIZZINESS: 0
FATIGUE: 0
CHILLS: 0
NAUSEA: 0
MYALGIAS: 0
HEMATURIA: 0

## 2025-08-07 ASSESSMENT — ACTIVITIES OF DAILY LIVING (ADL)
ADLS_ACUITY_SCORE: 41